# Patient Record
(demographics unavailable — no encounter records)

---

## 2024-12-03 NOTE — PHYSICAL EXAM
[Normal] : normal rate, regular rhythm, normal S1 and S2 and no murmur heard [Soft] : abdomen soft [Normal Bowel Sounds] : normal bowel sounds [de-identified] : mild TTP in LLQ

## 2024-12-03 NOTE — HISTORY OF PRESENT ILLNESS
[FreeTextEntry1] : abdominal pain [de-identified] : 58 y/o female with hx fibroids, GERD, hemorrhoids, nephrolithiasis presents for evaluation of abdominal pain. The pain started on Thursday, when she was very active including lifting and carrying things such as laundry baskets. She is going to [place] next week and wants to make sure she does not have another kidney stone. She had a kidney stone last Dec, which presented with back/flank pain and hematuria and passed naturally with an ultrasound confirming resolution afterward. Her current pain was in the LLQ of her abdomen. From Thurs-Sat she had no bowel movement. On Sat she took advil which helped the pain. The pain is now almost completely gone. No diarrhea, fever, flank pain, hematuria/dysuria. Of note, dairy gives her bloating but she says she has not had much lately.  Home OT

## 2024-12-03 NOTE — PHYSICAL EXAM
[Normal] : normal rate, regular rhythm, normal S1 and S2 and no murmur heard [Soft] : abdomen soft [Normal Bowel Sounds] : normal bowel sounds [de-identified] : mild TTP in LLQ

## 2024-12-03 NOTE — REVIEW OF SYSTEMS
[Abdominal Pain] : abdominal pain [Constipation] : constipation [Negative] : Neurological [Nausea] : no nausea [Diarrhea] : diarrhea [Vomiting] : no vomiting [Melena] : no melena [Dysuria] : no dysuria [Hematuria] : no hematuria

## 2024-12-03 NOTE — PHYSICAL EXAM
[Normal] : normal rate, regular rhythm, normal S1 and S2 and no murmur heard [Soft] : abdomen soft [Normal Bowel Sounds] : normal bowel sounds [de-identified] : mild TTP in LLQ

## 2024-12-03 NOTE — END OF VISIT
[] : Resident [FreeTextEntry3] : abd pain addressed as above. no BRBPR, or concerning stool hx. mgmt as above.  [Time Spent: ___ minutes] : I have spent [unfilled] minutes of time on the encounter which excludes teaching and separately reported services.

## 2024-12-03 NOTE — HISTORY OF PRESENT ILLNESS
[FreeTextEntry1] : abdominal pain [de-identified] : 56 y/o female with hx fibroids, GERD, hemorrhoids, nephrolithiasis presents for evaluation of abdominal pain. The pain started on Thursday, when she was very active including lifting and carrying things such as laundry baskets. She is going to [place] next week and wants to make sure she does not have another kidney stone. She had a kidney stone last Dec, which presented with back/flank pain and hematuria and passed naturally with an ultrasound confirming resolution afterward. Her current pain was in the LLQ of her abdomen. From Thurs-Sat she had no bowel movement. On Sat she took advil which helped the pain. The pain is now almost completely gone. No diarrhea, fever, flank pain, hematuria/dysuria. Of note, dairy gives her bloating but she says she has not had much lately.

## 2024-12-03 NOTE — HISTORY OF PRESENT ILLNESS
[FreeTextEntry1] : abdominal pain [de-identified] : 58 y/o female with hx fibroids, GERD, hemorrhoids, nephrolithiasis presents for evaluation of abdominal pain. The pain started on Thursday, when she was very active including lifting and carrying things such as laundry baskets. She is going to [employer] next week and wants to make sure she does not have another kidney stone. She had a kidney stone last Dec, which presented with back/flank pain and hematuria and passed naturally with an ultrasound confirming resolution afterward. Her current pain was in the LLQ of her abdomen. From Thurs-Sat she had no bowel movement. On Sat she took advil which helped the pain. The pain is now almost completely gone. No diarrhea, fever, flank pain, hematuria/dysuria. Of note, dairy gives her bloating but she says she has not had much lately.